# Patient Record
Sex: FEMALE | Race: BLACK OR AFRICAN AMERICAN | URBAN - METROPOLITAN AREA
[De-identification: names, ages, dates, MRNs, and addresses within clinical notes are randomized per-mention and may not be internally consistent; named-entity substitution may affect disease eponyms.]

---

## 2020-06-30 ENCOUNTER — OFFICE VISIT (OUTPATIENT)
Dept: URBAN - METROPOLITAN AREA CLINIC 113 | Facility: CLINIC | Age: 30
End: 2020-06-30

## 2020-07-17 ENCOUNTER — OFFICE VISIT (OUTPATIENT)
Dept: URBAN - METROPOLITAN AREA CLINIC 113 | Facility: CLINIC | Age: 30
End: 2020-07-17

## 2020-07-22 ENCOUNTER — OFFICE VISIT (OUTPATIENT)
Dept: URBAN - METROPOLITAN AREA CLINIC 113 | Facility: CLINIC | Age: 30
End: 2020-07-22

## 2020-07-25 ENCOUNTER — TELEPHONE ENCOUNTER (OUTPATIENT)
Dept: URBAN - METROPOLITAN AREA CLINIC 13 | Facility: CLINIC | Age: 30
End: 2020-07-25

## 2020-07-25 RX ORDER — POLYETHYLENE GLYCOL 3350, SODIUM CHLORIDE, SODIUM BICARBONATE AND POTASSIUM CHLORIDE WITH LEMON FLAVOR 420; 11.2; 5.72; 1.48 G/4L; G/4L; G/4L; G/4L
TAKE AS DIRECTED POWDER, FOR SOLUTION ORAL
Qty: 1 | Refills: 0 | OUTPATIENT
Start: 2020-04-30 | End: 2020-06-30

## 2020-07-25 RX ORDER — METFORMIN HYDROCHLORIDE 500 MG/1
TAKE 1 TABLET EVERY 12 HOURS WITH FOOD TABLET, COATED ORAL
Qty: 60 | Refills: 0 | OUTPATIENT
Start: 2020-02-06 | End: 2020-07-22

## 2020-07-25 RX ORDER — DULOXETINE 20 MG/1
TAKE 1 CAPSULE DAILY CAPSULE, DELAYED RELEASE PELLETS ORAL
Refills: 0 | OUTPATIENT
Start: 2019-06-04 | End: 2020-04-30

## 2020-07-25 RX ORDER — DIVALPROEX SODIUM 500 MG/1
TAKE 1 TABLET AT BEDTIME TABLET, DELAYED RELEASE ORAL
Refills: 0 | OUTPATIENT
Start: 2019-06-04 | End: 2020-04-30

## 2020-07-26 ENCOUNTER — TELEPHONE ENCOUNTER (OUTPATIENT)
Dept: URBAN - METROPOLITAN AREA CLINIC 13 | Facility: CLINIC | Age: 30
End: 2020-07-26

## 2020-07-26 RX ORDER — METHOCARBAMOL 750 MG/1
TABLET, FILM COATED ORAL
Qty: 36 | Refills: 0 | Status: ACTIVE | COMMUNITY
Start: 2020-03-11

## 2020-07-26 RX ORDER — VALACYCLOVIR 1 G/1
TK 1 T PO BID PRN TABLET, FILM COATED ORAL
Qty: 30 | Refills: 0 | Status: ACTIVE | COMMUNITY
Start: 2020-07-02

## 2020-07-26 RX ORDER — CEPHALEXIN 500 MG/1
CAPSULE ORAL
Qty: 14 | Refills: 0 | Status: ACTIVE | COMMUNITY
Start: 2020-07-09

## 2020-07-26 RX ORDER — GABAPENTIN 300 MG/1
TAKE 1 CAPSULE 3 TIMES DAILY CAPSULE ORAL
Refills: 0 | Status: ACTIVE | COMMUNITY
Start: 2020-05-20

## 2020-07-26 RX ORDER — CHLORHEXIDINE GLUCONATE 0.12% ORAL RINSE 1.2 MG/ML
SOLUTION ORAL
Qty: 473 | Refills: 0 | Status: ACTIVE | COMMUNITY
Start: 2019-08-27

## 2020-07-26 RX ORDER — HYDROCODONE BITARTRATE AND ACETAMINOPHEN 5; 325 MG/1; MG/1
TABLET ORAL
Qty: 26 | Refills: 0 | Status: ACTIVE | COMMUNITY
Start: 2019-12-30

## 2020-07-26 RX ORDER — VENLAFAXINE HYDROCHLORIDE 150 MG/1
TAKE 1 CAPSULE ONCE DAILY WITH FOOD CAPSULE, EXTENDED RELEASE ORAL
Refills: 0 | Status: ACTIVE | COMMUNITY
Start: 2020-05-08

## 2020-07-26 RX ORDER — PANTOPRAZOLE SODIUM 40 MG/1
TAKE 1 TABLET DAILY TABLET, DELAYED RELEASE ORAL
Qty: 30 | Refills: 5 | Status: ACTIVE | COMMUNITY
Start: 2020-04-30

## 2020-07-26 RX ORDER — CYCLOBENZAPRINE HYDROCHLORIDE 10 MG/1
TAKE 1 TABLET 3 TIMES DAILY AS NEEDED TABLET, FILM COATED ORAL
Refills: 0 | Status: ACTIVE | COMMUNITY
Start: 2020-05-20

## 2020-07-26 RX ORDER — AMOXICILLIN AND CLAVULANATE POTASSIUM 875; 125 MG/1; MG/1
TABLET, FILM COATED ORAL
Qty: 14 | Refills: 0 | Status: ACTIVE | COMMUNITY
Start: 2019-08-27

## 2020-07-26 RX ORDER — NYSTATIN AND TRIAMCINOLONE ACETONIDE 100000; 1 MG/G; MG/G
CREAM TOPICAL
Qty: 30 | Refills: 0 | Status: ACTIVE | COMMUNITY
Start: 2020-03-06

## 2020-07-26 RX ORDER — AMOXICILLIN 500 MG/1
TAKE 1 CAPSULE BY MOUTH THREE TIMES A DAY CAPSULE ORAL
Qty: 21 | Refills: 0 | Status: ACTIVE | COMMUNITY
Start: 2019-10-22

## 2020-07-26 RX ORDER — DIAZEPAM 10 MG/1
TAKE 1 TABLET BY MOUTH 1 HR BEFORE APPOINTMENT TABLET ORAL
Qty: 1 | Refills: 0 | Status: ACTIVE | COMMUNITY
Start: 2019-08-26

## 2020-07-26 RX ORDER — ACETAMINOPHEN AND CODEINE PHOSPHATE 300; 30 MG/1; MG/1
TABLET ORAL
Qty: 28 | Refills: 0 | Status: ACTIVE | COMMUNITY
Start: 2019-08-20

## 2020-07-26 RX ORDER — IBUPROFEN 600 MG/1
TABLET ORAL
Qty: 30 | Refills: 0 | Status: ACTIVE | COMMUNITY
Start: 2019-10-29

## 2020-07-26 RX ORDER — OSELTAMIVIR PHOSPHATE 75 MG/1
CAPSULE ORAL
Qty: 10 | Refills: 0 | Status: ACTIVE | COMMUNITY
Start: 2019-11-09

## 2020-07-26 RX ORDER — KETOROLAC TROMETHAMINE 10 MG/1
TAKE 1 TABLET BY MOUTH EVERY 6 TO 8 HOURS AS NEEDED FOR PAIN TABLET, FILM COATED ORAL
Qty: 20 | Refills: 0 | Status: ACTIVE | COMMUNITY
Start: 2020-02-24

## 2020-07-26 RX ORDER — ONDANSETRON 4 MG/1
TABLET, ORALLY DISINTEGRATING ORAL
Qty: 14 | Refills: 0 | Status: ACTIVE | COMMUNITY
Start: 2020-04-05

## 2020-07-26 RX ORDER — TRAMADOL HYDROCHLORIDE 50 MG/1
TABLET ORAL
Qty: 60 | Refills: 0 | Status: ACTIVE | COMMUNITY
Start: 2019-09-22

## 2020-07-26 RX ORDER — DIVALPROEX SODIUM 500 MG/1
TAKE 1 TABLET DAILY TABLET, FILM COATED, EXTENDED RELEASE ORAL
Refills: 0 | Status: ACTIVE | COMMUNITY
Start: 2020-05-08

## 2020-07-26 RX ORDER — METHOCARBAMOL 750 MG/1
TAKE 1 TABLET BY MOUTH 4 TIMES A DAY TABLET, FILM COATED ORAL
Qty: 36 | Refills: 0 | Status: ACTIVE | COMMUNITY
Start: 2020-02-24

## 2020-07-26 RX ORDER — DICYCLOMINE HYDROCHLORIDE 10 MG/1
CAPSULE ORAL
Qty: 15 | Refills: 0 | Status: ACTIVE | COMMUNITY
Start: 2020-07-09

## 2020-07-26 RX ORDER — CIPROFLOXACIN HYDROCHLORIDE 250 MG/1
TABLET, FILM COATED ORAL
Qty: 14 | Refills: 0 | Status: ACTIVE | COMMUNITY
Start: 2019-05-28

## 2020-07-26 RX ORDER — METRONIDAZOLE 7.5 MG/G
GEL VAGINAL
Qty: 70 | Refills: 0 | Status: ACTIVE | COMMUNITY
Start: 2020-05-14

## 2020-07-26 RX ORDER — CEPHALEXIN 500 MG/1
TK ONE C PO Q 12 H CAPSULE ORAL
Qty: 14 | Refills: 0 | Status: ACTIVE | COMMUNITY
Start: 2020-07-08

## 2020-07-26 RX ORDER — DIVALPROEX SODIUM 250 MG/1
TABLET, FILM COATED, EXTENDED RELEASE ORAL
Qty: 45 | Refills: 0 | Status: ACTIVE | COMMUNITY
Start: 2019-05-06

## 2020-07-26 RX ORDER — FLUCONAZOLE 150 MG/1
TABLET ORAL
Qty: 1 | Refills: 0 | Status: ACTIVE | COMMUNITY
Start: 2019-05-28

## 2020-07-26 RX ORDER — HYDROCODONE BITARTRATE AND ACETAMINOPHEN 5; 325 MG/1; MG/1
TABLET ORAL
Qty: 15 | Refills: 0 | Status: ACTIVE | COMMUNITY
Start: 2019-10-29

## 2020-07-26 RX ORDER — MONTELUKAST SODIUM 10 MG/1
TAKE 1 TABLET ADAILY AS NEEDED TABLET, FILM COATED ORAL
Refills: 0 | Status: ACTIVE | COMMUNITY
Start: 2020-03-23

## 2020-07-26 RX ORDER — TRAMADOL HYDROCHLORIDE 50 MG/1
TAKE 1 TABLET BY MOUTH EVERY 6 HOURS AS NEEDED FOR PAIN TABLET ORAL
Qty: 50 | Refills: 2 | Status: ACTIVE | COMMUNITY
Start: 2020-01-08

## 2020-07-26 RX ORDER — HYDROCODONE BITARTRATE AND ACETAMINOPHEN 7.5; 325 MG/1; MG/1
TABLET ORAL
Qty: 12 | Refills: 0 | Status: ACTIVE | COMMUNITY
Start: 2019-08-27

## 2020-07-26 RX ORDER — ONDANSETRON 4 MG/1
DISSOLVE 1 T UNDER THE TONGUE Q 8 H PRF VOMITING TABLET, ORALLY DISINTEGRATING ORAL
Qty: 14 | Refills: 0 | Status: ACTIVE | COMMUNITY
Start: 2020-04-06

## 2020-07-26 RX ORDER — DIAZEPAM 10 MG/1
TAKE 1 TABLET BY MOUTH 1 HOUR PRIOR TO APPT TABLET ORAL
Qty: 1 | Refills: 0 | Status: ACTIVE | COMMUNITY
Start: 2019-10-28

## 2020-07-26 RX ORDER — METHOCARBAMOL 750 MG/1
TAKE ONE TABLETBY MOUTH TWICE DAILY (DINNER AND BEDTIME) AS NEEDED FOR TABLET, FILM COATED ORAL
Qty: 20 | Refills: 0 | Status: ACTIVE | COMMUNITY
Start: 2019-05-21

## 2020-07-26 RX ORDER — METFORMIN HYDROCHLORIDE 500 MG/1
TABLET, COATED ORAL
Qty: 60 | Refills: 0 | Status: ACTIVE | COMMUNITY
Start: 2020-02-06

## 2020-07-26 RX ORDER — DICYCLOMINE HYDROCHLORIDE 10 MG/1
TK ONE C PO TID PRF ABDOMINAL CRAMPS CAPSULE ORAL
Qty: 15 | Refills: 0 | Status: ACTIVE | COMMUNITY
Start: 2020-07-08

## 2020-07-26 RX ORDER — DIVALPROEX SODIUM 500 MG/1
TAKE ONE TABLET BY MOUTH AT BEDTIME TABLET, DELAYED RELEASE ORAL
Qty: 30 | Refills: 0 | Status: ACTIVE | COMMUNITY
Start: 2019-06-03

## 2020-07-26 RX ORDER — KETOROLAC TROMETHAMINE 10 MG/1
TABLET, FILM COATED ORAL
Qty: 20 | Refills: 0 | Status: ACTIVE | COMMUNITY
Start: 2020-03-11

## 2020-07-26 RX ORDER — ONDANSETRON HYDROCHLORIDE 4 MG/1
TABLET, FILM COATED ORAL
Qty: 10 | Refills: 0 | Status: ACTIVE | COMMUNITY
Start: 2019-11-09

## 2020-07-26 RX ORDER — CEPHALEXIN 500 MG/1
CAPSULE ORAL
Qty: 14 | Refills: 0 | Status: ACTIVE | COMMUNITY
Start: 2020-04-05

## 2020-07-26 RX ORDER — ALBUTEROL SULFATE 90 UG/1
AEROSOL, METERED RESPIRATORY (INHALATION)
Qty: 8 | Refills: 0 | Status: ACTIVE | COMMUNITY
Start: 2020-03-23

## 2020-07-26 RX ORDER — ZIPRASIDONE HYDROCHLORIDE 20 MG/1
TAKE 1 CAPSULE TWICE DAILY CAPSULE ORAL
Refills: 0 | Status: ACTIVE | COMMUNITY
Start: 2020-07-19

## 2020-07-26 RX ORDER — HYDROCODONE BITARTRATE AND ACETAMINOPHEN 5; 325 MG/1; MG/1
TAKE 1 TABLET BY MOUTH EVERY 6 HOURS AS NEEDED FOR PAIN TABLET ORAL
Qty: 26 | Refills: 0 | Status: ACTIVE | COMMUNITY
Start: 2019-12-26

## 2020-07-26 RX ORDER — PREDNISONE 20 MG/1
TABLET ORAL
Qty: 10 | Refills: 0 | Status: ACTIVE | COMMUNITY
Start: 2020-04-01

## 2020-07-26 RX ORDER — ONDANSETRON 4 MG/1
TABLET, ORALLY DISINTEGRATING ORAL
Qty: 20 | Refills: 0 | Status: ACTIVE | COMMUNITY
Start: 2019-12-18

## 2020-07-26 RX ORDER — PREDNISONE 10 MG/1
TAKE AS DIRECTED ON PACKAGE TABLET ORAL
Qty: 21 | Refills: 0 | Status: ACTIVE | COMMUNITY
Start: 2019-05-21

## 2020-07-26 RX ORDER — DULOXETINE 20 MG/1
TAKE ONE CAPSULE BY MOUTH FOR ONE WEEK THEN TAKE 2 CAPSULES BY MOUTH D CAPSULE, DELAYED RELEASE PELLETS ORAL
Qty: 60 | Refills: 0 | Status: ACTIVE | COMMUNITY
Start: 2019-06-03

## 2020-07-26 RX ORDER — METHYLPREDNISOLONE 4 MG/1
TABLET ORAL
Qty: 21 | Refills: 0 | Status: ACTIVE | COMMUNITY
Start: 2020-04-17

## 2020-07-26 RX ORDER — CODEINE PHOSPHATE AND GUAIFENESIN 10; 100 MG/5ML; MG/5ML
TK 5 ML PO Q 6 H PRF COUGH LIQUID ORAL
Qty: 100 | Refills: 0 | Status: ACTIVE | COMMUNITY
Start: 2020-04-01

## 2020-07-26 RX ORDER — FLUCONAZOLE 150 MG/1
TABLET ORAL
Qty: 2 | Refills: 0 | Status: ACTIVE | COMMUNITY
Start: 2020-03-06

## 2020-07-26 RX ORDER — VENLAFAXINE HYDROCHLORIDE 75 MG/1
TAKE 1 CAPSULE DAILY CAPSULE, EXTENDED RELEASE ORAL
Refills: 0 | Status: ACTIVE | COMMUNITY
Start: 2020-05-08

## 2020-07-26 RX ORDER — METHYLPREDNISOLONE 4 MG/1
TAKE 1 DOSE PK(S) BY ORAL ROUTE AS DIRECTED TABLET ORAL
Qty: 21 | Refills: 0 | Status: ACTIVE | COMMUNITY
Start: 2019-10-07

## 2020-07-26 RX ORDER — OXYCODONE AND ACETAMINOPHEN 7.5; 325 MG/1; MG/1
TABLET ORAL
Qty: 40 | Refills: 0 | Status: ACTIVE | COMMUNITY
Start: 2019-12-18

## 2020-08-24 ENCOUNTER — OFFICE VISIT (OUTPATIENT)
Dept: URBAN - METROPOLITAN AREA CLINIC 113 | Facility: CLINIC | Age: 30
End: 2020-08-24
Payer: COMMERCIAL

## 2020-08-24 ENCOUNTER — WEB ENCOUNTER (OUTPATIENT)
Dept: URBAN - METROPOLITAN AREA CLINIC 113 | Facility: CLINIC | Age: 30
End: 2020-08-24

## 2020-08-24 ENCOUNTER — LAB OUTSIDE AN ENCOUNTER (OUTPATIENT)
Dept: URBAN - METROPOLITAN AREA CLINIC 113 | Facility: CLINIC | Age: 30
End: 2020-08-24

## 2020-08-24 VITALS
DIASTOLIC BLOOD PRESSURE: 80 MMHG | WEIGHT: 259 LBS | SYSTOLIC BLOOD PRESSURE: 115 MMHG | HEART RATE: 68 BPM | TEMPERATURE: 98 F | BODY MASS INDEX: 47.66 KG/M2 | RESPIRATION RATE: 18 BRPM | HEIGHT: 62 IN

## 2020-08-24 DIAGNOSIS — K59.09 OTHER CONSTIPATION: ICD-10-CM

## 2020-08-24 DIAGNOSIS — K64.8 HEMORRHOIDS, INTERNAL: ICD-10-CM

## 2020-08-24 DIAGNOSIS — R11.0 NAUSEA: ICD-10-CM

## 2020-08-24 PROCEDURE — 99214 OFFICE O/P EST MOD 30 MIN: CPT | Performed by: INTERNAL MEDICINE

## 2020-08-24 PROCEDURE — 1036F TOBACCO NON-USER: CPT | Performed by: INTERNAL MEDICINE

## 2020-08-24 PROCEDURE — G9903 PT SCRN TBCO ID AS NON USER: HCPCS | Performed by: INTERNAL MEDICINE

## 2020-08-24 PROCEDURE — G8427 DOCREV CUR MEDS BY ELIG CLIN: HCPCS | Performed by: INTERNAL MEDICINE

## 2020-08-24 RX ORDER — HYDROCORTISONE ACETATE 25 MG/1
1 SUPPOSITORY SUPPOSITORY RECTAL THREE TIMES A DAY
Qty: 90 | OUTPATIENT
Start: 2020-08-24 | End: 2020-09-23

## 2020-08-24 RX ORDER — BISACODYL 5 MG/1
1 TABLET AS NEEDED TABLET, DELAYED RELEASE ORAL ONCE A DAY
Status: ACTIVE | COMMUNITY

## 2020-08-24 RX ORDER — TRAMADOL HYDROCHLORIDE 50 MG/1
1 TABLET AS NEEDED TABLET ORAL ONCE A DAY
Refills: 0 | Status: ACTIVE | COMMUNITY
Start: 2019-09-22

## 2020-08-24 RX ORDER — ONDANSETRON HYDROCHLORIDE 4 MG/1
TABLET, FILM COATED ORAL
Qty: 10 | Refills: 0 | Status: ACTIVE | COMMUNITY
Start: 2019-11-09

## 2020-08-24 RX ORDER — POLYETHYLENE GLYCOL 3350, SODIUM CHLORIDE, SODIUM BICARBONATE, POTASSIUM CHLORIDE 420; 11.2; 5.72; 1.48 G/4L; G/4L; G/4L; G/4L
AS DIRECTED POWDER, FOR SOLUTION ORAL
Qty: 4000 | Refills: 0 | OUTPATIENT

## 2020-08-24 RX ORDER — MONTELUKAST SODIUM 10 MG/1
TAKE 1 TABLET ADAILY AS NEEDED TABLET, FILM COATED ORAL
Refills: 0 | Status: ACTIVE | COMMUNITY
Start: 2020-03-23

## 2020-08-24 RX ORDER — DOCUSATE SODIUM 100 MG/1
1 CAPSULE AS NEEDED CAPSULE ORAL ONCE A DAY
Status: ACTIVE | COMMUNITY

## 2020-08-24 RX ORDER — PLECANATIDE 3 MG/1
1 TABLET TABLET ORAL ONCE A DAY
Status: ACTIVE | COMMUNITY

## 2020-08-24 RX ORDER — LINACLOTIDE 290 UG/1
1 CAPSULE AT LEAST 30 MINUTES BEFORE THE FIRST MEAL OF THE DAY ON AN EMPTY STOMACH CAPSULE, GELATIN COATED ORAL ONCE A DAY
Qty: 30 | Refills: 2 | OUTPATIENT
Start: 2020-08-24 | End: 2020-11-21

## 2020-08-24 NOTE — PHYSICAL EXAM GASTROINTESTINAL
soft, nontender, nondistended , normal bowel sounds. Unable to appreciate organomegaly due to body habitus.

## 2020-08-24 NOTE — HPI-OTHER HISTORIES
EGD 5/5/20: regular Z line, erythematous mucosa in the antrum, and erythematous duodenopathy. Gastric biopsies showed reactive gastropathy, negative for H. pylori. Duodenal biopsy showed acute erosive duodenitis, negative for sprue. CT A/P with contrast 4/5/20: post operative changes in anterior abdominal and pelvic wall; gastric distention suspicious for gastroparesis or gastric outlet obstruction.

## 2020-08-24 NOTE — HPI-TODAY'S VISIT:
Ms. Medellin is a 29-year-old female presenting for evaluation of constipation. She was last seen on 7/22/2020 following an EGD.  She continued to experience postprandial sharp, aching and cramping pain in the right upper quadrant and epigastrium with nausea.  Gastric emptying study was ordered.  Amitriptyline was a consideration.  She was to continue pantoprazole.  Constipation was managed on MiraLAX. She has not had a normal bowel movement since August 13.  She went to the [place] ER twice last week. Mineral oil enema was ineffective. She is taking Trulance 3mg qd, Colace, MiraLax 17g bid, and Bisacodyl 2 tabs qam.  She strains to produce a small amount of watery stool, but as not had any normal stools.  She has also 3 bottles of magnesium citrate.  She had a D&C on 8/11/20, but constipation was worsening prior to this. She is off all pain medications. She continues to have abdominal pain in the upper and lower abdomen. She had a CT w/ contrast in the ER at [place] last week.  She was told by her GYN at time of D&C that she had adhesions from the umbilicus to the colon. Dr. Whitman performed lysis of adhesions at time of D&C.  She never was scheduled for GES, she did not receive a call.  She continues to complain of nausea.  She is vomiting a couple times a week.  She has decreased her intake due to nausea. She denies heartburn.

## 2022-07-20 ENCOUNTER — OFFICE VISIT (OUTPATIENT)
Dept: URBAN - METROPOLITAN AREA CLINIC 107 | Facility: CLINIC | Age: 32
End: 2022-07-20
Payer: COMMERCIAL

## 2022-07-20 ENCOUNTER — DASHBOARD ENCOUNTERS (OUTPATIENT)
Age: 32
End: 2022-07-20

## 2022-07-20 VITALS
WEIGHT: 269.8 LBS | SYSTOLIC BLOOD PRESSURE: 110 MMHG | BODY MASS INDEX: 49.65 KG/M2 | DIASTOLIC BLOOD PRESSURE: 85 MMHG | HEIGHT: 62 IN | HEART RATE: 75 BPM | TEMPERATURE: 98.3 F

## 2022-07-20 DIAGNOSIS — R11.0 NAUSEA: ICD-10-CM

## 2022-07-20 DIAGNOSIS — R19.06 EPIGASTRIC FULLNESS: ICD-10-CM

## 2022-07-20 DIAGNOSIS — K31.89 RETAINED FOOD IN STOMACH: ICD-10-CM

## 2022-07-20 PROCEDURE — 99214 OFFICE O/P EST MOD 30 MIN: CPT | Performed by: NURSE PRACTITIONER

## 2022-07-20 NOTE — HPI-TODAY'S VISIT:
Ms. Medellin is a 31-year-old female presenting for GI clearance prior to bariatric surgery. Recent records were reviewed via [facility] EMR. She was last seen 8/24/2020 for follow-up regarding worsening constipation, suspected to be secondary to narcotic use following recent surgery.  She has recommended a bowel purge with Nulytely followed by daily bowel regimen with Linzess 290 mcg daily.  Anusol suppositories were provided to use as needed for relief of hemorrhoidal symptoms.  Regarding nausea, with retained gastric contents on prior EGD concerning for gastroparesis, a gastric emptying study was planned. She states she never heard from the facility to schedule, so the GES was never performed. She continues to have occasional fullness in the epigastrium with some meals, even if she eats very little. She has heartburn and indigestion with dietary indiscretion, such as spicy foods or tomato-based products. She does not take any antacids. She denies abdominal pain or trouble swallowing. She is preparing for weight loss surgery with Dr. Baptiste. It has been recommended that she complete previously ordered GI testing prior to proceeding.